# Patient Record
Sex: FEMALE | Race: BLACK OR AFRICAN AMERICAN | Employment: FULL TIME | URBAN - METROPOLITAN AREA
[De-identification: names, ages, dates, MRNs, and addresses within clinical notes are randomized per-mention and may not be internally consistent; named-entity substitution may affect disease eponyms.]

---

## 2023-03-06 ENCOUNTER — HOSPITAL ENCOUNTER (EMERGENCY)
Age: 33
Discharge: HOME | End: 2023-03-06
Attending: EMERGENCY MEDICINE
Payer: COMMERCIAL

## 2023-03-06 VITALS
WEIGHT: 202 LBS | TEMPERATURE: 98 F | HEIGHT: 64 IN | SYSTOLIC BLOOD PRESSURE: 116 MMHG | BODY MASS INDEX: 34.49 KG/M2 | DIASTOLIC BLOOD PRESSURE: 75 MMHG | OXYGEN SATURATION: 97 % | HEART RATE: 86 BPM | RESPIRATION RATE: 16 BRPM

## 2023-03-06 DIAGNOSIS — S61.411A LACERATION OF RIGHT HAND WITHOUT FOREIGN BODY, INITIAL ENCOUNTER: Primary | ICD-10-CM

## 2023-03-06 PROCEDURE — 12042 INTMD RPR N-HF/GENIT2.6-7.5: CPT

## 2023-03-06 PROCEDURE — 99282 EMERGENCY DEPT VISIT SF MDM: CPT

## 2023-03-06 ASSESSMENT — ENCOUNTER SYMPTOMS: SHORTNESS OF BREATH: 0

## 2023-03-06 NOTE — DISCHARGE INSTRUCTIONS
Do not get wound wet for 24 hours. Then you may wash gently with soap and water. Pat dry after washing. Apply thin layer of Neosporin ointment. Follow-up with work well. Sutures need to be removed in about 2 weeks. Return to the emergency department for any new or worsening symptoms.

## 2023-03-06 NOTE — ED PROVIDER NOTES
Emergency Department Provider Note                   PCP:                None None               Age: 33 y.o. Sex: female     DISPOSITION Decision To Discharge 03/06/2023 02:00:30 PM       ICD-10-CM    1. Laceration of right hand without foreign body, initial encounter  [address]  Complexity of Problems Addressed:  1 acute, uncomplicated injury    Data Reviewed and Analyzed:  Category 1:   I reviewed records from an external source: provider visit notes from PCP. I ordered each unique test.  I reviewed the results of each unique test.        Category 2:       Category 3: Discussion of management or test interpretation. Well-appearing 71-year-old female presents emergency department today with complaint of laceration to the right hand. Patient exhibits full range of motion of the hand. No evidence of tendon disruption. No evidence of underlying fracture on exam.  Verbal consent obtained and wound repaired as documented in procedure note. Patient tolerated well. Educated on wound care. Educated on signs and symptoms of infection. She will follow-up with work well. She will return to the emergency department for any new or worsening symptoms. 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 Patient states she was working with a piece of metal at work today when her hand. She was wearing gloves but states that they were the right kind of gloves. Patient was seen at [facility] prior to coming to the emergency department. They gave her a tetanus shot there today.   She states that the area continued to bleed so they sent her to the emergency department for repair. She states they did numb the laceration at [facility]. She denies issues moving her hand. The history is provided by the patient. Laceration  Location:  Hand  Hand laceration location:  R hand  Length:  2 cm  Depth: Through underlying tissue  Quality: straight    Bleeding: controlled with pressure    Laceration mechanism:  Metal edge  Pain details:     Severity:  No pain  Foreign body present:  No foreign bodies  Tetanus status:  Up to date  Associated symptoms: no fever, no focal weakness, no redness, no swelling and no streaking       Review of Systems   Constitutional:  Negative for fever. Respiratory:  Negative for shortness of breath. Cardiovascular:  Negative for chest pain. Skin:  Positive for wound. Neurological:  Negative for focal weakness. All other systems reviewed and are negative. Vitals signs and nursing note reviewed. Patient Vitals for the past 4 hrs:   Temp Pulse Resp BP SpO2   03/06/23 1334 -- 86 -- 116/75 --   03/06/23 1333 98 °F (36.7 °C) -- 16 -- 97 %          Physical Exam  Vitals and nursing note reviewed. Constitutional:       General: She is not in acute distress. Appearance: Normal appearance. She is well-developed. She is not ill-appearing, toxic-appearing or diaphoretic. HENT:      Head: Normocephalic and atraumatic. Mouth/Throat:      Mouth: Mucous membranes are moist.   Eyes:      General: No scleral icterus. Extraocular Movements: Extraocular movements intact. Cardiovascular:      Rate and Rhythm: Normal rate. Pulmonary:      Effort: Pulmonary effort is normal. No respiratory distress. Abdominal:      General: Abdomen is flat. There is no distension. Musculoskeletal:      Right hand: Laceration present. No swelling, deformity, tenderness or bony tenderness. Normal range of motion. Normal strength. Normal sensation. Normal capillary refill. Normal pulse.       Comments: Laceration of right hand. Patient has full ROM of the hand and all digits. No evidence of tendon disruption. Does not appear to be underlying fracture. Skin:     General: Skin is warm and dry. Capillary Refill: Capillary refill takes less than 2 seconds. Findings: Laceration present. Comments: 2 cm laceration to palmar surface of right hand at the   base of the thumb, between the thumb and index finger   Neurological:      General: No focal deficit present. Mental Status: She is alert and oriented to person, place, and time. Psychiatric:         Mood and Affect: Mood normal.         Behavior: Behavior normal.        Lac Repair    Date/Time: 3/6/2023 2:44 PM  Performed by: CARIDAD Mcwilliams - CNP  Authorized by: Kehinde Zepeda MD     Consent:     Consent obtained:  Verbal    Consent given by:  Patient    Risks, benefits, and alternatives were discussed: yes      Risks discussed:  Infection, pain, poor cosmetic result and poor wound healing  Universal protocol:     Procedure explained and questions answered to patient or proxy's satisfaction: yes      Site/side marked: yes      Immediately prior to procedure, a time out was called: yes      Patient identity confirmed:  Verbally with patient  Anesthesia:     [place] [facility] [place].   Laceration details:     Location:  Hand    Hand location:  R palm    Length (cm):  2    Depth (mm):  3  Pre-procedure details:     Preparation:  Patient was prepped and draped in usual sterile fashion  Exploration:     Limited defect created (wound extended): no      Hemostasis achieved with:  Direct pressure    Wound extent: no foreign bodies/material noted, no tendon damage noted and no underlying fracture noted    Treatment:     Area cleansed with:  Saline    Amount of cleaning:  Extensive    Irrigation solution:  Sterile saline    Irrigation method:  Syringe    Debridement:  None    Undermining:  None Scar revision: no    Skin repair:     Repair method:  Sutures    Suture size:  4-0    Suture material:  Prolene    Suture technique:  Simple interrupted    Number of sutures:  5  Approximation:     Approximation:  Close  Repair type:     Repair type:  Simple  Post-procedure details:     Dressing:  Antibiotic ointment and non-adherent dressing    Procedure completion:  Tolerated well, no immediate complications     Orders Placed This Encounter   Procedures    LACERATION REPAIR    [facility] [place] nursing order (specify)        Medications - No data to display    New Prescriptions    No medications on file        No past medical history on file. No past surgical history on file. No family history on file. Social History     Socioeconomic History    Marital status: Single        Allergies: Patient has no known allergies. Previous Medications    No medications on file        No results found for any visits on 03/06/23. No orders to display                     Voice dictation software was used during the making of this note. This software is not perfect and grammatical and other typographical errors may be present. This note has not been completely proofread for errors.        Shannan Saavedra, CARIDAD - [place]  03/06/23 [id number]

## 2023-03-06 NOTE — ED TRIAGE NOTES
Pt states laceration to the R palm by a metal pan while at work for [employer]. Pt already received tetanus shot today.